# Patient Record
Sex: FEMALE | Race: WHITE
[De-identification: names, ages, dates, MRNs, and addresses within clinical notes are randomized per-mention and may not be internally consistent; named-entity substitution may affect disease eponyms.]

---

## 2019-04-08 ENCOUNTER — APPOINTMENT (OUTPATIENT)
Dept: ORTHOPEDIC SURGERY | Facility: CLINIC | Age: 69
End: 2019-04-08
Payer: MEDICARE

## 2019-04-08 VITALS — WEIGHT: 293 LBS | BODY MASS INDEX: 45.99 KG/M2 | HEIGHT: 67 IN

## 2019-04-08 DIAGNOSIS — M17.12 UNILATERAL PRIMARY OSTEOARTHRITIS, LEFT KNEE: ICD-10-CM

## 2019-04-08 DIAGNOSIS — Z80.9 FAMILY HISTORY OF MALIGNANT NEOPLASM, UNSPECIFIED: ICD-10-CM

## 2019-04-08 DIAGNOSIS — Z78.9 OTHER SPECIFIED HEALTH STATUS: ICD-10-CM

## 2019-04-08 DIAGNOSIS — Z60.2 PROBLEMS RELATED TO LIVING ALONE: ICD-10-CM

## 2019-04-08 DIAGNOSIS — Z96.651 PRESENCE OF RIGHT ARTIFICIAL KNEE JOINT: ICD-10-CM

## 2019-04-08 DIAGNOSIS — Z86.39 PERSONAL HISTORY OF OTHER ENDOCRINE, NUTRITIONAL AND METABOLIC DISEASE: ICD-10-CM

## 2019-04-08 DIAGNOSIS — Z82.61 FAMILY HISTORY OF ARTHRITIS: ICD-10-CM

## 2019-04-08 DIAGNOSIS — M25.562 PAIN IN LEFT KNEE: ICD-10-CM

## 2019-04-08 PROCEDURE — 99203 OFFICE O/P NEW LOW 30 MIN: CPT

## 2019-04-08 PROCEDURE — 73560 X-RAY EXAM OF KNEE 1 OR 2: CPT | Mod: RT

## 2019-04-08 PROCEDURE — 73564 X-RAY EXAM KNEE 4 OR MORE: CPT | Mod: LT

## 2019-04-08 SDOH — SOCIAL STABILITY - SOCIAL INSECURITY: PROBLEMS RELATED TO LIVING ALONE: Z60.2

## 2019-04-08 NOTE — HISTORY OF PRESENT ILLNESS
[de-identified] : 68 year old female presents for evaluation of left knee pain and her right TKR done by Dr. Verma now at 5 years. Patient is doing well with her right knee and has no complaints Her main complaint is left knee pain which has been present for the past 2 months. Patient denies any specific injury. She locates her pain in the anterior and medial aspect of the knee which is sharp and dull intermittently and increased on weight bearing. She reports swelling and clicking but denies any other mechanical symptoms.. She can walk 2 blocks without a cane, walker, or brace and takes the stairs one at a time using the handrail. Patient takes Advil prn for pain with some relief. Today, she would like to discuss her treatment options with Dr. Verma.  Of note, her DM is not controlled, and her latest A1C is 8-9, by patient report. \par

## 2019-04-08 NOTE — ADDENDUM
[FreeTextEntry1] : This note was written by Esperanza Marte on 04/08/2019 acting as scribe for Dr. Colin Verma M.D.\par \par I, Dr. Colin Verma M.D., have read and attest that all the information, medical decision making and discharge instructions within are true and accurate.\par

## 2019-04-08 NOTE — DISCUSSION/SUMMARY
[de-identified] : Discussed at length the nature of the patients condition. Their left knee symptoms appear secondary to degenerative arthritis, especially at the patellofemoral joint. We reviewed operative and nonoperative treatment. While I believe she would eventually benefit from a TKR, I do not think surgery is warranted at this time. I have recommended PT, weight loss, and Advil or Aleve prn. She declined any injections at this time. Patient may follow up with x-rays of both knees in 6 months, sooner if any acute problems.

## 2019-04-08 NOTE — REASON FOR VISIT
[Initial Visit] : an initial visit for [Knee Pain] : knee pain [Artificial Knee Joint] : artificial knee joint

## 2019-10-02 PROBLEM — Z60.2 PERSON LIVING ALONE: Status: ACTIVE | Noted: 2019-04-08

## 2019-10-14 VITALS
HEART RATE: 78 BPM | RESPIRATION RATE: 18 BRPM | HEIGHT: 67 IN | OXYGEN SATURATION: 95 % | SYSTOLIC BLOOD PRESSURE: 123 MMHG | TEMPERATURE: 98 F | DIASTOLIC BLOOD PRESSURE: 55 MMHG | WEIGHT: 293 LBS

## 2019-10-14 NOTE — ASU PATIENT PROFILE, ADULT - PMH
Gastroesophageal reflux disease  GERD (gastroesophageal reflux disease)  History of urinary stone  History of renal calculi  HTN (hypertension)    Lung nodules    Type 2 diabetes mellitus  type 2 Gastroesophageal reflux disease  GERD (gastroesophageal reflux disease)  History of urinary stone  History of renal calculi  HTN (hypertension)    Lung nodules  right  Type 2 diabetes mellitus  type 2

## 2019-10-14 NOTE — ASU PATIENT PROFILE, ADULT - PSH
Bilateral cataracts    History of knee replacement, total, right    History of surgery  2016 R VATs  Other postprocedural status  left Bilateral cataracts  2012  History of knee replacement, total, right  2015  History of surgery  2013 left wrist  History of surgery  2016 R VATs Bilateral cataracts  2012  History of knee replacement, total, right  2015  History of surgery  2013 left wrist  History of surgery  2016 R VATs right lung

## 2019-10-14 NOTE — ASU PATIENT PROFILE, ADULT - TEACHING/LEARNING LEARNING PREFERENCES
individual instruction/verbal instruction/written material skill demonstration/verbal instruction/individual instruction/written material

## 2019-10-15 ENCOUNTER — OUTPATIENT (OUTPATIENT)
Dept: OUTPATIENT SERVICES | Facility: HOSPITAL | Age: 69
LOS: 1 days | Discharge: HOME | End: 2019-10-15
Payer: COMMERCIAL

## 2019-10-15 VITALS
OXYGEN SATURATION: 97 % | RESPIRATION RATE: 20 BRPM | HEART RATE: 66 BPM | SYSTOLIC BLOOD PRESSURE: 130 MMHG | TEMPERATURE: 97 F | DIASTOLIC BLOOD PRESSURE: 60 MMHG

## 2019-10-15 DIAGNOSIS — H26.9 UNSPECIFIED CATARACT: Chronic | ICD-10-CM

## 2019-10-15 DIAGNOSIS — Z98.890 OTHER SPECIFIED POSTPROCEDURAL STATES: Chronic | ICD-10-CM

## 2019-10-15 DIAGNOSIS — Z96.651 PRESENCE OF RIGHT ARTIFICIAL KNEE JOINT: Chronic | ICD-10-CM

## 2019-10-15 LAB
GLUCOSE BLDC GLUCOMTR-MCNC: 171 MG/DL — HIGH (ref 70–99)
GLUCOSE BLDC GLUCOMTR-MCNC: 202 MG/DL — HIGH (ref 70–99)
GLUCOSE BLDC GLUCOMTR-MCNC: 217 MG/DL — HIGH (ref 70–99)
GLUCOSE BLDC GLUCOMTR-MCNC: 229 MG/DL — HIGH (ref 70–99)

## 2019-10-15 PROCEDURE — 15836 EXC EXCESSIVE SKIN ARM: CPT | Mod: 50

## 2019-10-15 PROCEDURE — 82962 GLUCOSE BLOOD TEST: CPT

## 2019-10-15 RX ORDER — SODIUM CHLORIDE 9 MG/ML
1000 INJECTION, SOLUTION INTRAVENOUS
Refills: 0 | Status: DISCONTINUED | OUTPATIENT
Start: 2019-10-15 | End: 2019-10-15

## 2019-10-15 RX ORDER — HYDROMORPHONE HYDROCHLORIDE 2 MG/ML
0.5 INJECTION INTRAMUSCULAR; INTRAVENOUS; SUBCUTANEOUS
Refills: 0 | Status: DISCONTINUED | OUTPATIENT
Start: 2019-10-15 | End: 2019-10-15

## 2019-10-15 RX ADMIN — HYDROMORPHONE HYDROCHLORIDE 0.5 MILLIGRAM(S): 2 INJECTION INTRAMUSCULAR; INTRAVENOUS; SUBCUTANEOUS at 12:50

## 2019-10-15 RX ADMIN — HYDROMORPHONE HYDROCHLORIDE 0.5 MILLIGRAM(S): 2 INJECTION INTRAMUSCULAR; INTRAVENOUS; SUBCUTANEOUS at 13:05

## 2019-10-15 NOTE — PACU DISCHARGE NOTE - COMMENTS
Discharge home instructions and med. reconciliation instructions given to patient and expressed understanding. [place] on right radial and IV  on left hand removed as per policy. Bilateral upper arm dressing clean, dry and intact. Denies any pain or discomfort @ present. Left unit per ambulatory with good steady gait escorted by sister.

## 2019-10-21 ENCOUNTER — APPOINTMENT (OUTPATIENT)
Dept: ORTHOPEDIC SURGERY | Facility: CLINIC | Age: 69
End: 2019-10-21